# Patient Record
(demographics unavailable — no encounter records)

---

## 2024-10-17 NOTE — PHYSICAL EXAM
[Normal Rate] : normal rate  [Regular Rhythm] : with a regular rhythm [Normal S1, S2] : normal S1 and S2 [No Carotid Bruits] : no carotid bruits [No Abdominal Bruit] : a ~M bruit was not heard ~T in the abdomen [No Edema] : there was no peripheral edema [Soft] : abdomen soft [Non Tender] : non-tender [Non-distended] : non-distended [No Masses] : no abdominal mass palpated [No HSM] : no HSM [Normal Bowel Sounds] : normal bowel sounds [Normal Supraclavicular Nodes] : no supraclavicular lymphadenopathy [Normal Anterior Cervical Nodes] : no anterior cervical lymphadenopathy [No Focal Deficits] : no focal deficits [Normal Gait] : normal gait [Speech Grossly Normal] : speech grossly normal [Memory Grossly Normal] : memory grossly normal [Normal Affect] : the affect was normal [Alert and Oriented x3] : oriented to person, place, and time [Normal] : normal gait, coordination grossly intact, no focal deficits and deep tendon reflexes were 2+ and symmetric [de-identified] : spider varicosities [de-identified] : DJD changes hands

## 2024-10-17 NOTE — HEALTH RISK ASSESSMENT
[Yes] : Yes [Monthly or less (1 pt)] : Monthly or less (1 point) [1 or 2 (0 pts)] : 1 or 2 (0 points) [Never (0 pts)] : Never (0 points) [No] : In the past 12 months have you used drugs other than those required for medical reasons? No [Two or more falls in past year] : Patient reported two or more falls in the past year [0] : 2) Feeling down, depressed, or hopeless: Not at all (0) [PHQ-2 Negative - No further assessment needed] : PHQ-2 Negative - No further assessment needed [Former] : Former [0-4] : 0-4 [> 15 Years] : > 15 Years [Patient reported mammogram was normal] : Patient reported mammogram was normal [Patient reported PAP Smear was normal] : Patient reported PAP Smear was normal [Patient reported bone density results were abnormal] : Patient reported bone density results were abnormal [Patient reported colonoscopy was normal] : Patient reported colonoscopy was normal [With Significant Other] : lives with significant other [# of Members in Household ___] :  household currently consist of [unfilled] member(s) [Employed] : employed [] :  [# Of Children ___] : has [unfilled] children [Fully functional (bathing, dressing, toileting, transferring, walking, feeding)] : Fully functional (bathing, dressing, toileting, transferring, walking, feeding) [Fully functional (using the telephone, shopping, preparing meals, housekeeping, doing laundry, using] : Fully functional and needs no help or supervision to perform IADLs (using the telephone, shopping, preparing meals, housekeeping, doing laundry, using transportation, managing medications and managing finances) [Smoke Detector] : smoke detector [Carbon Monoxide Detector] : carbon monoxide detector [Seat Belt] :  uses seat belt [Reports changes in vision] : Reports changes in vision [Reports normal functional visual acuity (ie: able to read med bottle)] : Reports normal functional visual acuity [With Patient/Caregiver] : , with patient/caregiver [Designated Healthcare Proxy] : Designated healthcare proxy [Name: ___] : Health Care Proxy's Name: [unfilled]  [Relationship: ___] : Relationship: [unfilled] [facility] [HIV test declined] : HIV test declined [Hepatitis C test declined] : Hepatitis C test declined [Retired] : retired [Audit-CScore] : 1 [de-identified] : No regular exercise program [de-identified] : Unrestricted Adult Diet [[id number]] : 0 [de-identified] : smoked 15-20 yrs  1 PP Week- quit 1984 [Change in mental status noted] : No change in mental status noted [MammogramDate] : 6/5/23 [MammogramComments] : [facility] [PapSmearDate] : 6/5/23 [BoneDensityDate] : 10/19/23 [BoneDensityComments] : +osteoporosis [ColonoscopyDate] : 2021 [ColonoscopyComments] : Dr. Sb Jackson- 5 yr recall [HIVComments] : no risk factors [HepatitisCComments] : no risk factors [de-identified] : UTD with Optho- [AdvancecareDate] : 10/2024

## 2024-10-17 NOTE — HISTORY OF PRESENT ILLNESS
[de-identified] : JOHN PRUETT is a 74 year F who presents today for an Annual Physical. She has a past hx of HTN, HLD, GERD. She medicated for all of these conditions.  She has long standing osteoporosis but has been reluctant to medicate.  She is feeling well at today's visit and offers no specific complaints.

## 2024-10-17 NOTE — HISTORY OF PRESENT ILLNESS
[de-identified] : JOHN PRUETT is a 74 year F who presents today for an Annual Physical. She has a past hx of HTN, HLD, GERD. She medicated for all of these conditions.  She has long standing osteoporosis but has been reluctant to medicate.  She is feeling well at today's visit and offers no specific complaints.

## 2024-10-17 NOTE — PHYSICAL EXAM
[Normal Rate] : normal rate  [Regular Rhythm] : with a regular rhythm [Normal S1, S2] : normal S1 and S2 [No Carotid Bruits] : no carotid bruits [No Abdominal Bruit] : a ~M bruit was not heard ~T in the abdomen [No Edema] : there was no peripheral edema [Soft] : abdomen soft [Non Tender] : non-tender [Non-distended] : non-distended [No Masses] : no abdominal mass palpated [No HSM] : no HSM [Normal Bowel Sounds] : normal bowel sounds [Normal Supraclavicular Nodes] : no supraclavicular lymphadenopathy [Normal Anterior Cervical Nodes] : no anterior cervical lymphadenopathy [No Focal Deficits] : no focal deficits [Normal Gait] : normal gait [Speech Grossly Normal] : speech grossly normal [Memory Grossly Normal] : memory grossly normal [Normal Affect] : the affect was normal [Alert and Oriented x3] : oriented to person, place, and time [Normal] : normal gait, coordination grossly intact, no focal deficits and deep tendon reflexes were 2+ and symmetric [de-identified] : spider varicosities [de-identified] : DJD changes hands

## 2024-10-17 NOTE — REVIEW OF SYSTEMS
[Patient Intake Form Reviewed] : Patient intake form was reviewed [Shortness Of Breath] : shortness of breath [Wheezing] : wheezing [Joint Pain] : joint pain [Negative] : Heme/Lymph

## 2024-10-17 NOTE — HEALTH RISK ASSESSMENT
[Yes] : Yes [Monthly or less (1 pt)] : Monthly or less (1 point) [1 or 2 (0 pts)] : 1 or 2 (0 points) [Never (0 pts)] : Never (0 points) [No] : In the past 12 months have you used drugs other than those required for medical reasons? No [Two or more falls in past year] : Patient reported two or more falls in the past year [0] : 2) Feeling down, depressed, or hopeless: Not at all (0) [PHQ-2 Negative - No further assessment needed] : PHQ-2 Negative - No further assessment needed [Former] : Former [0-4] : 0-4 [> 15 Years] : > 15 Years [Patient reported mammogram was normal] : Patient reported mammogram was normal [Patient reported PAP Smear was normal] : Patient reported PAP Smear was normal [Patient reported bone density results were abnormal] : Patient reported bone density results were abnormal [Patient reported colonoscopy was normal] : Patient reported colonoscopy was normal [With Significant Other] : lives with significant other [# of Members in Household ___] :  household currently consist of [unfilled] member(s) [Employed] : employed [] :  [# Of Children ___] : has [unfilled] children [Fully functional (bathing, dressing, toileting, transferring, walking, feeding)] : Fully functional (bathing, dressing, toileting, transferring, walking, feeding) [Fully functional (using the telephone, shopping, preparing meals, housekeeping, doing laundry, using] : Fully functional and needs no help or supervision to perform IADLs (using the telephone, shopping, preparing meals, housekeeping, doing laundry, using transportation, managing medications and managing finances) [Smoke Detector] : smoke detector [Carbon Monoxide Detector] : carbon monoxide detector [Seat Belt] :  uses seat belt [Reports changes in vision] : Reports changes in vision [Reports normal functional visual acuity (ie: able to read med bottle)] : Reports normal functional visual acuity [With Patient/Caregiver] : , with patient/caregiver [Designated Healthcare Proxy] : Designated healthcare proxy [Name: ___] : Health Care Proxy's Name: [unfilled]  [Relationship: ___] : Relationship: [unfilled] [[facility] [HIV test declined] : HIV test declined [Hepatitis C test declined] : Hepatitis C test declined [Retired] : retired [Audit-CScore] : 1 [de-identified] : No regular exercise program [de-identified] : Unrestricted Adult Diet [[id number]] : 0 [de-identified] : smoked 15-20 yrs  1 PP Week- quit 1984 [Change in mental status noted] : No change in mental status noted [MammogramDate] : 6/5/23 [MammogramComments] : [facility] [PapSmearDate] : 6/5/23 [BoneDensityDate] : 10/19/23 [BoneDensityComments] : +osteoporosis [ColonoscopyDate] : 2021 [ColonoscopyComments] : Dr. Sb Jackson- 5 yr recall [HIVComments] : no risk factors [HepatitisCComments] : no risk factors [de-identified] : UTD with Optho- [AdvancecareDate] : 10/2024

## 2024-10-22 NOTE — PLAN
[TextEntry] : Reassurance about all skin growths Photo taken of the lesion on the left anterior neck  Return 1 year   CMS time of visit: 17 minutes

## 2024-10-22 NOTE — HISTORY OF PRESENT ILLNESS
[FreeTextEntry1] : Evaluation of growths [de-identified] : Follow-up visit for 74-year-old white female last seen by me on July 17, 2024, for evaluation of growths. Particularly concerned about a lesion on the left shin.  Patient has history of basal and squamous cell carcinoma treated in the past, 1 by Mohs surgery in the right lateral cheek.  Patient also has a history of low-grade folliculitis on the legs. Treated with: Start 10% benzoyl peroxide wash in shower  Patient also has a history of residual erythema from prior trauma on the forehead.  Had previously been treated with 2-1/2% hydrocortisone cream. This was discontinued at the last visit. Option of laser discussed.  This has improved.

## 2024-10-22 NOTE — PHYSICAL EXAM
[FreeTextEntry3] : The following areas were examined and no significant abnormalities were seen except as noted below:  Type II skin  scalp, face, eyelids, nose, lips, ears, neck, chest, abdomen, back, buttocks, right arm, left arm, right hand, left hand, right  leg, left leg, right foot, left foot Breast and groin exams offered and declined by patient.  Left anterior neck: 2 close by uniformly tan patches with an area of depigmentation between them (patient states she has had this a long time)-not suspicious on dermoscopy (Lesions were described as brown verrucous patches on the visit of March 9, 2022) There is also a lighter more ill-defined tan patch above the depigmentation Trunk: Mild tan verrucous papules Left mid shin: 8 x 5 mm well-demarcated brown patch-not suspicious on dermoscopy (lesion unchanged in size in the visit of May 8, 2024)   No suspicious lesions seen

## 2024-11-16 NOTE — PHYSICAL EXAM
[No Acute Distress] : no acute distress [Well Developed] : well developed [Well Nourished] : well nourished [Normal Sclera/Conjunctiva] : normal sclera/conjunctiva [Normal Outer Ear/Nose] : the outer ears and nose were normal in appearance [No Lymphadenopathy] : no lymphadenopathy [Normal] : no CVA or spinal tenderness [No Focal Deficits] : no focal deficits [Grossly Normal Strength/Tone] : grossly normal strength/tone [Speech Grossly Normal] : speech grossly normal [Normal Affect] : the affect was normal [Normal Mood] : the mood was normal [de-identified] : nasal-hoarse sounding voice [de-identified] : minimal posterior pharyngeal erythema, no exudate

## 2024-11-16 NOTE — COUNSELING
[Benefits of weight loss discussed] : Benefits of weight loss discussed [Encouraged to increase physical activity] : Encouraged to increase physical activity [FreeTextEntry2] : Eating too many carbs and concentrated sweets-

## 2024-11-16 NOTE — PHYSICAL EXAM
[No Acute Distress] : no acute distress [Well Developed] : well developed [Well Nourished] : well nourished [Normal Sclera/Conjunctiva] : normal sclera/conjunctiva [Normal Outer Ear/Nose] : the outer ears and nose were normal in appearance [No Lymphadenopathy] : no lymphadenopathy [Normal] : no CVA or spinal tenderness [Grossly Normal Strength/Tone] : grossly normal strength/tone [No Focal Deficits] : no focal deficits [Speech Grossly Normal] : speech grossly normal [Normal Affect] : the affect was normal [Normal Mood] : the mood was normal [de-identified] : nasal-hoarse sounding voice [de-identified] : minimal posterior pharyngeal erythema, no exudate

## 2024-11-16 NOTE — PLAN
[FreeTextEntry1] : ........  THIS VISIT WAS PART OF AN ONGOING LONGITUDAL RELATIONSHIP DESIGNED TO ADDRESS THE MAJORITY OF THE PATIENT'S HEALTH CARE NEEDS WITH CONSISTENCY OVER A LONG PERIOD OF TIME.

## 2024-11-16 NOTE — HEALTH RISK ASSESSMENT
[0] : 2) Feeling down, depressed, or hopeless: Not at all (0) [PHQ-2 Negative - No further assessment needed] : PHQ-2 Negative - No further assessment needed [Former] : Former [0-4] : 0-4 [> 15 Years] : > 15 Years [[id number]] : 0 [de-identified] : smoked 15-20 yrs  1 PP Week- quit 1984

## 2024-11-16 NOTE — HEALTH RISK ASSESSMENT
[0] : 2) Feeling down, depressed, or hopeless: Not at all (0) [PHQ-2 Negative - No further assessment needed] : PHQ-2 Negative - No further assessment needed [Former] : Former [0-4] : 0-4 [> 15 Years] : > 15 Years [[id number]] : 0 [de-identified] : smoked 15-20 yrs  1 PP Week- quit 1984

## 2024-11-16 NOTE — HISTORY OF PRESENT ILLNESS
[FreeTextEntry8] : JOHN PRUETT is a 74 year F with HTN, GERD and HLD who presents today  with complaints not feeling well X 1 week. Sore throat, left earache. neck achiness.  Reports no fever or chills.  No cough, + nasal discharge.  Taking Tylenol with temporary symptom control. No sick contacts.   She is also here to discuss the results of a recent A1c. It is drawn on Tuesday. It returned more elevated than it had previously. Labs reviewed, previously 5.7-5.9%. Now @ 6.2%. We discussed why the rise. She reports consuming Mallomars daily after her  secured a couple of cases of the cookie. She gained 2.5 lbs in just the last 5 weeks.

## 2025-05-06 NOTE — HISTORY OF PRESENT ILLNESS
[de-identified] : JOHN PRUETT is a 74 year F with a past hx of HTN, HLD, GERD. She medicated for all of these conditions. She also has prediabetes and is watching her diet. She has long standing osteoporosis but has been reluctant to medicate.  She is feeling well at today's visit and offers no specific complaints. She sees Dr. Hua ([facility]) and is scheduled for stress testing on Thursday.  She states her Pulmonologist Dr. Juanito Menendez prescribed a new inhaler (Breo) it cost $400- she never filled it. She continues to wheeze.

## 2025-05-06 NOTE — PHYSICAL EXAM
[No Respiratory Distress] : no respiratory distress  [Normal Rate] : normal rate  [Regular Rhythm] : with a regular rhythm [Normal S1, S2] : normal S1 and S2 [No CVA Tenderness] : no CVA  tenderness [No Spinal Tenderness] : no spinal tenderness [Grossly Normal Strength/Tone] : grossly normal strength/tone [Speech Grossly Normal] : speech grossly normal [Alert and Oriented x3] : oriented to person, place, and time [Normal Mood] : the mood was normal [Normal] : no acute distress, well nourished, well developed and well-appearing [Normal Sclera/Conjunctiva] : normal sclera/conjunctiva [Normal Outer Ear/Nose] : the outer ears and nose were normal in appearance [Normal Oropharynx] : the oropharynx was normal [No Lymphadenopathy] : no lymphadenopathy [No Accessory Muscle Use] : no accessory muscle use [No Focal Deficits] : no focal deficits [de-identified] : +wheezes [de-identified] : +varicosities

## 2025-05-06 NOTE — HEALTH RISK ASSESSMENT
[Former] : Former [0-4] : 0-4 [> 15 Years] : > 15 Years [de-identified] : smoked 15-20 yrs  1 PP Week- quit 1984